# Patient Record
Sex: MALE | URBAN - METROPOLITAN AREA
[De-identification: names, ages, dates, MRNs, and addresses within clinical notes are randomized per-mention and may not be internally consistent; named-entity substitution may affect disease eponyms.]

---

## 2017-01-11 ENCOUNTER — TELEPHONE (OUTPATIENT)
Dept: GASTROENTEROLOGY | Facility: CLINIC | Age: 62
End: 2017-01-11

## 2017-01-11 NOTE — TELEPHONE ENCOUNTER
----- Message from Danae Soler sent at 1/11/2017  9:41 AM CST -----  Contact: [phone] / self   Patient requesting to speak with you regarding a bill he received for a procedure he had done on Jan 27,2016 at [facility].    States it is now in collections.    Please advise.

## 2017-01-11 NOTE — TELEPHONE ENCOUNTER
TIARAM telephone #s that pt could call regarding his bill from 2016 [facility] [phone] .

## 2017-01-12 NOTE — TELEPHONE ENCOUNTER
LMOVM returning pts call regarding a Colonoscopy done at [facility] in 2016. Pt is to call [place] 's office at [phone].